# Patient Record
Sex: FEMALE | URBAN - METROPOLITAN AREA
[De-identification: names, ages, dates, MRNs, and addresses within clinical notes are randomized per-mention and may not be internally consistent; named-entity substitution may affect disease eponyms.]

---

## 2021-02-15 ENCOUNTER — TELEPHONE (OUTPATIENT)
Dept: DIABETES SERVICES | Age: 56
End: 2021-02-15

## 2021-03-03 ENCOUNTER — HOSPITAL ENCOUNTER (OUTPATIENT)
Dept: DIABETES SERVICES | Age: 56
Setting detail: THERAPIES SERIES
Discharge: HOME | End: 2021-03-03
Payer: COMMERCIAL

## 2021-03-03 DIAGNOSIS — E11.00 TYPE 2 DIABETES MELLITUS WITH HYPEROSMOLARITY WITHOUT COMA, WITHOUT LONG-TERM CURRENT USE OF INSULIN (HCC): Primary | ICD-10-CM

## 2021-03-03 PROCEDURE — G0108 DIAB MANAGE TRN  PER INDIV: HCPCS

## 2021-03-03 RX ORDER — LISINOPRIL 5 MG/1
TABLET ORAL
COMMUNITY

## 2021-03-03 RX ORDER — LIRAGLUTIDE 6 MG/ML
INJECTION SUBCUTANEOUS
COMMUNITY

## 2021-03-03 RX ORDER — NATEGLINIDE 120 MG/1
120 TABLET ORAL
COMMUNITY

## 2021-03-03 RX ORDER — FLUTICASONE PROPIONATE 50 MCG
SPRAY, SUSPENSION (ML) NASAL
COMMUNITY
Start: 2020-08-20

## 2021-03-03 RX ORDER — METFORMIN HYDROCHLORIDE 500 MG/1
TABLET, EXTENDED RELEASE ORAL 2 TIMES DAILY
COMMUNITY
Start: 2020-05-28

## 2021-03-03 RX ORDER — SODIUM CHLORIDE 0.65 %
DROPS NASAL
COMMUNITY
Start: 2020-08-20

## 2021-03-03 RX ORDER — MELOXICAM 15 MG/1
TABLET ORAL
COMMUNITY
Start: 2020-10-22

## 2021-03-03 RX ORDER — ALBUTEROL SULFATE 2.5 MG/3ML
SOLUTION RESPIRATORY (INHALATION)
COMMUNITY
Start: 2020-08-20

## 2021-03-03 RX ORDER — VITAMIN B COMPLEX
1 CAPSULE ORAL DAILY
COMMUNITY

## 2021-03-03 RX ORDER — TRIAMCINOLONE ACETONIDE 5 MG/G
CREAM TOPICAL
COMMUNITY
Start: 2020-10-22

## 2021-03-03 RX ORDER — CRISABOROLE 20 MG/G
OINTMENT TOPICAL
COMMUNITY
Start: 2020-09-21

## 2021-03-03 RX ORDER — GLIPIZIDE 10 MG/1
10 TABLET ORAL
COMMUNITY

## 2021-03-03 RX ORDER — MAGNESIUM OXIDE/MAG AA CHELATE 300 MG
CAPSULE ORAL
COMMUNITY

## 2021-03-03 RX ORDER — CHOLECALCIFEROL (VITAMIN D3) 100000/G
POWDER (GRAM) MISCELLANEOUS
COMMUNITY

## 2021-03-03 RX ORDER — OMEPRAZOLE 20 MG/1
TABLET, DELAYED RELEASE ORAL
COMMUNITY

## 2021-03-03 SDOH — ECONOMIC STABILITY: FOOD INSECURITY: ADDITIONAL INFORMATION: NO

## 2021-03-03 ASSESSMENT — PROBLEM AREAS IN DIABETES QUESTIONNAIRE (PAID)
FEELING THAT DIABETES IS TAKING UP TOO MUCH OF YOUR MENTAL AND PHYSICAL ENERGY EVERY DAY: 4
PAID-5 TOTAL SCORE: 12
FEELING SCARED WHEN YOU THINK ABOUT LIVING WITH DIABETES: 1
WORRYING ABOUT THE FUTURE AND THE POSSIBILITY OF SERIOUS COMPLICATIONS: 4

## 2021-03-03 NOTE — LETTER
[facility]  [facility] [address]  [place] [address] [address]  Phone: [phone]         March 3, 2021    To : Dr Callie Dos Santos  From: Cassi Dumont RN    Patient: Rosa White   YOB: 1965   Date of Visit: 3/3/21     An initial assessment to determine diabetes education needs was completed. Education plan includes :interpretation of lab results, blood sugar goals, complications of diabetes mellitus, hypoglycemia prevention and treatment, exercise, illness management, self-monitoring of blood glucose skills, nutrition, carbohydrate counting and role of diabetes medications. An ongoing plan was created to include: follow up one on one session with RD     Thank you for the opportunity to provide Diabetes Self Management Education to your patient.

## 2021-03-03 NOTE — PROGRESS NOTES
Diabetes Self- Management Education Program Assessment -   Also see Diabetic Screening  Patient, Philly Ayala,  here for diabetes self-management education  visit/ assessment. Today's visit was in an individual setting. MEDICAL HISTORY:  Past Medical History:   Diagnosis Date    Other disorders of kidney and ureter in diseases classified elsewhere     Pneumonia     mack lung - right lowe lobe     Family History   Problem Relation Age of Onset    Diabetes Paternal Grandmother     Diabetes Paternal Grandfather      Patient has no allergy information on record. There is no immunization history on file for this patient.   Current Medications  Current Outpatient Medications   Medication Sig Dispense Refill    metFORMIN (GLUCOPHAGE-XR) 500 MG extended release tablet 2 times daily       Liraglutide (VICTOZA) 18 MG/3ML SOPN SC injection as directed      SITagliptin (JANUVIA) 100 MG tablet 1 tablet      fluticasone-salmeterol (ADVAIR DISKUS) 100-50 MCG/DOSE diskus inhaler 1 puff      albuterol (PROVENTIL) (2.5 MG/3ML) 0.083% nebulizer solution 3 ml as needed      sodium chloride (AYR SALINE NASAL DROPS) 0.65 % (Soln) SOLN nasal drops 1 spray      Crisaborole (EUCRISA) 2 % OINT 1 application      fluticasone (FLONASE) 50 MCG/ACT nasal spray 1 spray in each nostril      lisinopril (PRINIVIL;ZESTRIL) 5 MG tablet 1 tablet      meloxicam (MOBIC) 15 MG tablet 1 tablet      guaiFENesin (MUCINEX) 600 MG extended release tablet 1 tablet      omeprazole (PRILOSEC OTC) 20 MG tablet 2 times daily (before meals)       triamcinolone (ARISTOCORT) 0.5 % cream 1 application      Cholecalciferol (VITAMIN D3) 497042 UNIT/GM POWD 1 tablet      glipiZIDE (GLUCOTROL) 10 MG tablet Take 10 mg by mouth 2 times daily (before meals) Indications: Dr Rip Richard      nateglinide (STARLIX) 120 MG tablet Take 120 mg by mouth 3 times daily (before meals) Indications: Dr Diego Feliz b complex vitamins capsule Take 1 capsule by mouth daily      Magnesium 300 MG CAPS 1 capsule with a meal       No current facility-administered medications for this encounter.    :     Comments:  Allergies:  Not on File      A1C blood level - at goal < 7%   No results found for: LABA1C  No results found for: GLUF, LABMICR, LDLCALC, CREATININE    Blood pressure ( 130/ 80)  Or less  BP Readings from Last 3 Encounters:   No data found for BP        Cholesterol ( LDL under  100)   No results found for: LDLCALC, LDLCHOLESTEROL, LDLDIRECT    Diabetes Self- Management Education Record    Participant Name: Estanislao Kawasaki  Referring Provider: No primary care provider on file. Assessment/Evaluation Ratings:  1=Needs Instruction   4=Demonstrates Understanding/Competency  2=Needs Review   NC=Not Covered    3=Comprehends Key Points  N/A=Not Applicable  Topics/Learning Objectives Pre-session Assess Date:  3/3/21rs Instr. Date Reinforce Date Post- session Eval Comments   Diabetes disease process & Treatment process: Define diabetes & pre-diabetes; Identify own type of diabetes; role of the pancreas; signs/symptoms; diagnostic criteria; prevention & treatment options; contributing factors. 1 3/3/21   3/3/21- ref from dr Karyn Russell   Patient stated had GDM 29 years ago and the pre diabetes for several years - now BG going up  And met with Dr Karyn Russell / darryl   Incorporating nutritional management into lifestyle: Describe effect of type, amount & timing of food on blood glucose; Describe basic meal planning techniques & current nutrition guideline   1  See notes in chart     What to eat - Food groups, When to eat - timing of meals and snacks, and How much to eat - portions control.     3/3/21rs Patient does read food labels and likes to cook her own meals - given target CHO of 45 - 60 grams and CHO ct sheet and booklet - + diabetesfoodhub.org website - patient very motivated to learn       calories/ day   CHO choices/ meal   CHO choices/  day   grams of protein /day   gram of fat /day Correctly read food labels & demonstrate CHO counting & portion control with personalized meal plan. Identify dining out strategies, & dietary sick day guidelines. 1       Incorporating physical activity into lifestyle:   Verbalize effect of exercise on blood glucose levels; benefits of regular exercise; safety considerations; contraindications; maintenance of activity. 1    - 3/3/21rs  less active than in past - now has lung disease and more SOB - she was working as  for school and had to leave due to this lung issue - now only tolerates short bouts of activity   Using medications safely:  Identify effects of diabetes medicines on blood glucose levels; List diabetes medication taken, action & side effects;    1    .3/3/21rs  On orals  - stated missed pre meal starlix - new med for patient and she is not used to taking meds with meals- also on metformin and glipizide and Januvia    Insulin / Injectable - Appropriate injection sites; proper storage; supplies needed; proper technique; safe needle disposal guidelines. 1    3/3/21rs  victoza - has cost issue with the medication - printed off [facility] website phone number to call to see if she would be able to get copay card or assistance    Monitoring blood glucose, interpreting and using results:  Identify recommended & personal blood glucose targets; importance of testing; testing supplies; HgbA1C target levels; Factors affecting blood glucose;  Importance of logging blood glucose levels for pattern recognition; ketone testing; safe lancet disposal.   1    3/3/21rs  Per Dr Marielena Myers fax A1c of 8.0% on 1/15/2021  Checking BG 2x per day - stated strip cost is 35 for 50 strips - discussed ways to find out if new meter would cost less as pt is also now under  insurance plan  - may need to change brand also discussed CGM     - 3/3/21rs  she did have CGM on for 2 weeks with Dr Marielena Myers eval - stated patten seen was elevated post meal BG      3/3/21rs  printed off info for freestyle tiffanie free trail program     Prevention, detection & treatment of acute complications:  Identify symptoms of hyper & hypoglycemia, and prevention1 & treatment strategies. 1       Describe sick day guidelines & indications for  physician notification. Identify short term consequences of poor control. Disaster preparedness strategies    1       Prevention, detection & treatment of chronic complications:  Define the natural course of diabetes & describe the relationship of blood glucose levels to long term complications of diabetes. Identify preventative measures & standards of care. 1    P3/3/21rs  er fax ref from dr Ralph Keller - , HDL 61, LDL 84, Tri 119 , urine micro albumin of 8.5   Developing strategies to address psychosocial issues:  Describe feelings about living with diabetes; Describe how stress, depression & anxiety affect blood glucose; Identify coping strategies; Identify support needed & support network available. 1    - 3/3/21rs  express frustration on her lung disease and now not working - as she worked all her adult life - worried about DM getting worse. - also worries about cost of medications and BG monitoring supplies - and follow up DMED appt  now she had to stop working - less family income   Developing strategies to promote health/change behavior: Identify 7 self-care behaviors; Personal health risk factors; Benefits, challenges & strategies for behavioral change;    1         Individualized goal selection. My goal , to help me improve my health, I will:   1. Healthy eating  - start to plan meals and try to keep in CHO targets      2.  Monitoring - follow up with insurance for cost coverage of freestyle tiffanie vs new meter / different band for lower cost of strips        Plan  Follow-up Appointments planned in individual setting - start with MNT session in 3 weeks    - given to patient CPT codes for DSME and MNT session and her diabetes diagnosis code to call insurance herself to see if diabetes education session are covered - 3/3/21rs       Next Appointment in 3 weeks. Instruction Method: [x]Lecture/Discussion  []Power Point Presentation  []Handouts  []Return Demonstration      Education Materials/Equipment Provided:    [x]Self-Management - Initial assessment - Enrolment in to ADA  Where do I Begin, Living with Type 2 diabetes\"  ADA home support program and handout for no concentrated sweets and diet meal planning basics, handout on diabetes education classes. -- 3/3/21rs       []Self-Management  Class 1 - \"How to Thrive: A guide for Your journey with Diabetes\" - ADA booklet 2020  - pages 4, 11- 15 , 18 -19     [] Self-Management  Class 2 - Meal Plan and handout for serving sizes, smarter snacking, Ready Set Carb Counting / Plate Method, Nutrient Conversion and International [address] Eating for People with Diabetes and Nutrition in the [employer] - fast facts about fast food -How to Thrive: A guide for Your journey with Diabetes\" - ADA booklet 2020  - pages 12 -17    [] Self-Management  Class 3 -  Diabetes ID card,  foot care tips sheet,  Individualized Diabetes report card - \"How to Thrive: A guide for Your journey with Diabetes\" - ADA booklet 2020 page 6- 9  and 20 - 35    [] Self-Management Class 4 - BD Booklet  Sick Day Rules and  [address] , recipe hand outs and tips, diabetes Cookbooks  ( when available) How to Thrive: A guide for Your journey with Diabetes\" - ADA booklet 2020  - pages 39 -39     []Self-Management -  Self-Management - 3 month follow -  AADE7 Self care behaviors work sheets,  Online resource list - March 2020  and   How to Thrive: A guide for Your journey with Diabetes\" - ADA booklet 2020  - pages 39. []Self-Management  Gestational - RN class -Gestational Diabetes Mellitus ( GDM) toolkit form [place] gestational diabetes postpartum care learning collaborative 2018.    Gestational diabetes handout from number - for information and referral to [address]  Clinically  [address], FOOT, CARDIAC, WOUND, WEIGHT MANAGEMENT        []Other  Clarissa Hong, RN

## 2021-03-23 ENCOUNTER — HOSPITAL ENCOUNTER (OUTPATIENT)
Dept: DIABETES SERVICES | Age: 56
Setting detail: THERAPIES SERIES
Discharge: HOME | End: 2021-03-23
Payer: COMMERCIAL

## 2021-03-23 DIAGNOSIS — E11.00 TYPE 2 DIABETES MELLITUS WITH HYPEROSMOLARITY WITHOUT COMA, WITHOUT LONG-TERM CURRENT USE OF INSULIN (HCC): Primary | ICD-10-CM

## 2021-03-23 PROCEDURE — 97802 MEDICAL NUTRITION INDIV IN: CPT

## 2021-03-23 NOTE — PROGRESS NOTES
Diabetes Self- Management Education Program Assessment -   Also see Diabetic Screening  Patient, Marleny Sahni,  here for diabetes self-management education  visit/ assessment. Today's visit was in an individual setting. MEDICAL HISTORY:  Past Medical History:   Diagnosis Date    Other disorders of kidney and ureter in diseases classified elsewhere     Pneumonia     mack lung - right lowe lobe     Family History   Problem Relation Age of Onset    Diabetes Paternal Grandmother     Diabetes Paternal Grandfather      Patient has no allergy information on record. There is no immunization history on file for this patient.   Current Medications  Current Outpatient Medications   Medication Sig Dispense Refill    metFORMIN (GLUCOPHAGE-XR) 500 MG extended release tablet 2 times daily       Liraglutide (VICTOZA) 18 MG/3ML SOPN SC injection as directed      SITagliptin (JANUVIA) 100 MG tablet 1 tablet      fluticasone-salmeterol (ADVAIR DISKUS) 100-50 MCG/DOSE diskus inhaler 1 puff      albuterol (PROVENTIL) (2.5 MG/3ML) 0.083% nebulizer solution 3 ml as needed      sodium chloride (AYR SALINE NASAL DROPS) 0.65 % (Soln) SOLN nasal drops 1 spray      Crisaborole (EUCRISA) 2 % OINT 1 application      fluticasone (FLONASE) 50 MCG/ACT nasal spray 1 spray in each nostril      lisinopril (PRINIVIL;ZESTRIL) 5 MG tablet 1 tablet      Magnesium 300 MG CAPS 1 capsule with a meal      meloxicam (MOBIC) 15 MG tablet 1 tablet      guaiFENesin (MUCINEX) 600 MG extended release tablet 1 tablet      omeprazole (PRILOSEC OTC) 20 MG tablet 2 times daily (before meals)       triamcinolone (ARISTOCORT) 0.5 % cream 1 application      Cholecalciferol (VITAMIN D3) 327915 UNIT/GM POWD 1 tablet      glipiZIDE (GLUCOTROL) 10 MG tablet Take 10 mg by mouth 2 times daily (before meals) Indications: Dr Darin Mackay      nateglinide (STARLIX) 120 MG tablet Take 120 mg by mouth 3 times daily (before meals) Indications: Dr Mendel Patel b complex vitamins capsule Take 1 capsule by mouth daily       No current facility-administered medications for this encounter.    :     Comments:  Allergies:  Not on File      A1C blood level - at goal < 7%   No results found for: LABA1C  No results found for: GLUF, LABMICR, LDLCALC, CREATININE    Blood pressure ( 130/ 80)  Or less  BP Readings from Last 3 Encounters:   No data found for BP        Cholesterol ( LDL under  100)   No results found for: LDLCALC, LDLCHOLESTEROL, LDLDIRECT    Diabetes Self- Management Education Record    Participant Name: Monik Spence  Referring Provider: No primary care provider on file. Assessment/Evaluation Ratings:  1=Needs Instruction   4=Demonstrates Understanding/Competency  2=Needs Review   NC=Not Covered    3=Comprehends Key Points  N/A=Not Applicable  Topics/Learning Objectives Pre-session Assess Date:  3/3/21rs Instr. Date Reinforce Date Post- session Eval Comments   Diabetes disease process & Treatment process: Define diabetes & pre-diabetes; Identify own type of diabetes; role of the pancreas; signs/symptoms; diagnostic criteria; prevention & treatment options; contributing factors. 1 3/3/21   3/3/21- ref from dr Baylee Yi   Patient stated had GDM 29 years ago and the pre diabetes for several years - now BG going up  And met with Dr Baylee Yi / darryl   Incorporating nutritional management into lifestyle: Describe effect of type, amount & timing of food on blood glucose; Describe basic meal planning techniques & current nutrition guideline   1  See notes in chart  3/23/21 JW reviewed basics of cho counting and meal planning. Pt has good knowledge base of info just struggles at times with implementing. What to eat - Food groups, When to eat - timing of meals and snacks, and How much to eat - portions control.     3/3/21rs Patient does read food labels and likes to cook her own meals - given target CHO of 45 - 60 grams and CHO ct sheet and booklet - + diabetesfoodhub.org website - patient very motivated to learn    Suggested [id number] calories/ day   CHO choices/ meal 3-4,1 3-4,1,3-4,1   CHO choices/  day   grams of protein /day   gram of fat /day     Correctly read food labels & demonstrate CHO counting & portion control with personalized meal plan. Identify dining out strategies, & dietary sick day guidelines. 1 3/23/21 JW      Incorporating physical activity into lifestyle:   Verbalize effect of exercise on blood glucose levels; benefits of regular exercise; safety considerations; contraindications; maintenance of activity. 1    - 3/3/21rs  less active than in past - now has lung disease and more SOB - she was working as  for school and had to leave due to this lung issue - now only tolerates short bouts of activity   Using medications safely:  Identify effects of diabetes medicines on blood glucose levels; List diabetes medication taken, action & side effects;    1    .3/3/21rs  On orals  - stated missed pre meal starlix - new med for patient and she is not used to taking meds with meals- also on metformin and glipizide and Januvia    Insulin / Injectable - Appropriate injection sites; proper storage; supplies needed; proper technique; safe needle disposal guidelines. 1    3/3/21rs  victoza - has cost issue with the medication - printed off [facility] website phone number to call to see if she would be able to get copay card or assistance    Monitoring blood glucose, interpreting and using results:  Identify recommended & personal blood glucose targets; importance of testing; testing supplies; HgbA1C target levels; Factors affecting blood glucose;  Importance of logging blood glucose levels for pattern recognition; ketone testing; safe lancet disposal.   1 3/23/21 JW   3/3/21rs  Per Dr Nabil Sadler fax A1c of 8.0% on 1/15/2021  Checking BG 2x per day - stated strip cost is 35 for 50 strips - discussed ways to find out if new meter would cost less as pt is also now under  meter / different band for lower cost of strips        Plan  Follow-up Appointments planned in individual setting - start with MNT session in 3 weeks    - given to patient CPT codes for DSME and MNT session and her diabetes diagnosis code to call insurance herself to see if diabetes education session are covered - 3/3/21rs       Next Appointment in 3 weeks. Instruction Method: [x]Lecture/Discussion  []Power Point Presentation  []Handouts  []Return Demonstration      Education Materials/Equipment Provided:    [x]Self-Management - Initial assessment - Enrolment in to ADA  Where do I Begin, Living with Type 2 diabetes\"  ADA home support program and handout for no concentrated sweets and diet meal planning basics, handout on diabetes education classes.  -- 3/3/21rs       []Self-Management  Class 1 - \"How to Thrive: A guide for Your journey with Diabetes\" - ADA booklet 2020  - pages 4, 11- 15 , 18 -19     [x] Self-Management  Class 2 - Meal Plan and handout for serving sizes, smarter snacking, Ready Set Carb Counting / Plate Method, Nutrient Conversion and International [address] Eating for People with Diabetes and Nutrition in the [place] [address] - fast facts about fast food -How to Thrive: A guide for Your journey with Diabetes\" - ADA booklet 2020  - pages 12 -173/23/21 JW  [] Self-Management  Class 3 -  Diabetes ID card,  foot care tips sheet,  Individualized Diabetes report card - \"How to Thrive: A guide for Your journey with Diabetes\" - ADA booklet 2020 page 6- 9  and 20 - 35    [] Self-Management Class 4 - BD Booklet  Sick Day Rules and  [address] , recipe hand outs and tips, diabetes Cookbooks  ( when available) How to Thrive: A guide for Your journey with Diabetes\" - ADA booklet 2020  - pages 39 -39     []Self-Management -  Self-Management - 3 month follow -  AADE7 Self care behaviors work sheets,  Online resource list - March 2020  and   How to Thrive: A guide for Your journey [email or web address] org     [x]  Internet web sites - ADAWeb site: diabetes. org and diabetesfoodhub.org-- 3/3/21rs     Post Education Referrals:      [] [place] Tobacco Quit information sheet and [address] [facility] , [address]       [] Dental care - [facility]     [] [facility] ([facility]) link  phone number - for information and referral to [address] [facility], WOUND, WEIGHT MANAGEMENT        []Other  Cheryl Adkins RD, LD

## 2024-05-29 ENCOUNTER — HOSPITAL ENCOUNTER (EMERGENCY)
Age: 59
Discharge: HOME | End: 2024-05-29
Attending: EMERGENCY MEDICINE
Payer: COMMERCIAL

## 2024-05-29 ENCOUNTER — APPOINTMENT (OUTPATIENT)
Dept: CT IMAGING | Age: 59
End: 2024-05-29
Payer: COMMERCIAL

## 2024-05-29 VITALS
DIASTOLIC BLOOD PRESSURE: 86 MMHG | HEIGHT: 69 IN | HEART RATE: 108 BPM | BODY MASS INDEX: 34.07 KG/M2 | RESPIRATION RATE: 18 BRPM | TEMPERATURE: 98.1 F | SYSTOLIC BLOOD PRESSURE: 140 MMHG | WEIGHT: 230 LBS | OXYGEN SATURATION: 97 %

## 2024-05-29 DIAGNOSIS — J01.10 ACUTE NON-RECURRENT FRONTAL SINUSITIS: ICD-10-CM

## 2024-05-29 DIAGNOSIS — R07.9 CHEST PAIN, UNSPECIFIED TYPE: Primary | ICD-10-CM

## 2024-05-29 DIAGNOSIS — R00.0 TACHYCARDIA: ICD-10-CM

## 2024-05-29 LAB
ANION GAP SERPL CALCULATED.3IONS-SCNC: 10 MMOL/L (ref 9–17)
BACTERIA URNS QL MICRO: ABNORMAL
BASOPHILS # BLD: 0 K/UL (ref 0–0.2)
BASOPHILS NFR BLD: 0 % (ref 0–2)
BILIRUB UR QL STRIP: NEGATIVE
BUN SERPL-MCNC: 15 MG/DL (ref 6–20)
CALCIUM SERPL-MCNC: 9.9 MG/DL (ref 8.6–10.4)
CHARACTER UR: ABNORMAL
CHLORIDE SERPL-SCNC: 101 MMOL/L (ref 98–107)
CLARITY UR: CLEAR
CO2 SERPL-SCNC: 25 MMOL/L (ref 20–31)
COLOR UR: YELLOW
CREAT SERPL-MCNC: 0.8 MG/DL (ref 0.5–0.9)
EKG ATRIAL RATE: 109 BPM
EKG P AXIS: 55 DEGREES
EKG P-R INTERVAL: 162 MS
EKG Q-T INTERVAL: 334 MS
EKG QRS DURATION: 70 MS
EKG QTC CALCULATION (BAZETT): 449 MS
EKG R AXIS: -40 DEGREES
EKG T AXIS: 40 DEGREES
EKG VENTRICULAR RATE: 109 BPM
EOSINOPHIL # BLD: 0.1 K/UL (ref 0–0.4)
EOSINOPHILS RELATIVE PERCENT: 1 % (ref 1–4)
EPI CELLS #/AREA URNS HPF: ABNORMAL /HPF (ref 0–5)
ERYTHROCYTE [DISTWIDTH] IN BLOOD BY AUTOMATED COUNT: 12.9 % (ref 12.5–15.4)
GFR, ESTIMATED: 86 ML/MIN/1.73M2
GLUCOSE BLD-MCNC: 216 MG/DL (ref 65–105)
GLUCOSE SERPL-MCNC: 326 MG/DL (ref 70–99)
GLUCOSE UR STRIP-MCNC: ABNORMAL MG/DL
HCT VFR BLD AUTO: 42.8 % (ref 36–46)
HGB BLD-MCNC: 14.3 G/DL (ref 12–16)
HGB UR QL STRIP.AUTO: NEGATIVE
KETONES UR STRIP-MCNC: ABNORMAL MG/DL
LEUKOCYTE ESTERASE UR QL STRIP: ABNORMAL
LYMPHOCYTES NFR BLD: 1.5 K/UL (ref 1–4.8)
LYMPHOCYTES RELATIVE PERCENT: 13 % (ref 24–44)
MAGNESIUM SERPL-MCNC: 1.6 MG/DL (ref 1.6–2.6)
MCH RBC QN AUTO: 29.5 PG (ref 26–34)
MCHC RBC AUTO-ENTMCNC: 33.4 G/DL (ref 31–37)
MCV RBC AUTO: 88.4 FL (ref 80–100)
MONOCYTES NFR BLD: 0.6 K/UL (ref 0.1–1.2)
MONOCYTES NFR BLD: 5 % (ref 2–11)
NEUTROPHILS NFR BLD: 81 % (ref 36–66)
NEUTS SEG NFR BLD: 9.3 K/UL (ref 1.8–7.7)
NITRITE UR QL STRIP: NEGATIVE
PH UR STRIP: 6 [PH] (ref 5–8)
PLATELET # BLD AUTO: 297 K/UL (ref 140–450)
PMV BLD AUTO: 8.5 FL (ref 6–12)
POTASSIUM SERPL-SCNC: 4 MMOL/L (ref 3.7–5.3)
PROT UR STRIP-MCNC: NEGATIVE MG/DL
RBC # BLD AUTO: 4.83 M/UL (ref 4–5.2)
RBC #/AREA URNS HPF: ABNORMAL /HPF (ref 0–2)
SARS-COV-2 RDRP RESP QL NAA+PROBE: NOT DETECTED
SODIUM SERPL-SCNC: 136 MMOL/L (ref 135–144)
SP GR UR STRIP: 1.02 (ref 1–1.03)
SPECIMEN DESCRIPTION: NORMAL
T4 FREE SERPL-MCNC: 1.5 NG/DL (ref 0.92–1.68)
TROPONIN I SERPL HS-MCNC: 9 NG/L (ref 0–14)
TROPONIN I SERPL HS-MCNC: 9 NG/L (ref 0–14)
TSH SERPL DL<=0.05 MIU/L-ACNC: 1.53 UIU/ML (ref 0.3–5)
UROBILINOGEN UR STRIP-ACNC: NORMAL EU/DL (ref 0–1)
WBC #/AREA URNS HPF: ABNORMAL /HPF (ref 0–5)
WBC OTHER # BLD: 11.5 K/UL (ref 3.5–11)

## 2024-05-29 PROCEDURE — 85025 COMPLETE CBC W/AUTO DIFF WBC: CPT

## 2024-05-29 PROCEDURE — 2580000003 HC RX 258: Performed by: NURSE PRACTITIONER

## 2024-05-29 PROCEDURE — 36415 COLL VENOUS BLD VENIPUNCTURE: CPT

## 2024-05-29 PROCEDURE — 6360000004 HC RX CONTRAST MEDICATION: Performed by: NURSE PRACTITIONER

## 2024-05-29 PROCEDURE — 84443 ASSAY THYROID STIM HORMONE: CPT

## 2024-05-29 PROCEDURE — 82947 ASSAY GLUCOSE BLOOD QUANT: CPT

## 2024-05-29 PROCEDURE — 81001 URINALYSIS AUTO W/SCOPE: CPT

## 2024-05-29 PROCEDURE — 83735 ASSAY OF MAGNESIUM: CPT

## 2024-05-29 PROCEDURE — 96361 HYDRATE IV INFUSION ADD-ON: CPT

## 2024-05-29 PROCEDURE — 87635 SARS-COV-2 COVID-19 AMP PRB: CPT

## 2024-05-29 PROCEDURE — 93005 ELECTROCARDIOGRAM TRACING: CPT | Performed by: NURSE PRACTITIONER

## 2024-05-29 PROCEDURE — 96360 HYDRATION IV INFUSION INIT: CPT

## 2024-05-29 PROCEDURE — 80048 BASIC METABOLIC PNL TOTAL CA: CPT

## 2024-05-29 PROCEDURE — 99285 EMERGENCY DEPT VISIT HI MDM: CPT

## 2024-05-29 PROCEDURE — 84484 ASSAY OF TROPONIN QUANT: CPT

## 2024-05-29 PROCEDURE — 71260 CT THORAX DX C+: CPT

## 2024-05-29 PROCEDURE — 84439 ASSAY OF FREE THYROXINE: CPT

## 2024-05-29 RX ORDER — 0.9 % SODIUM CHLORIDE 0.9 %
80 INTRAVENOUS SOLUTION INTRAVENOUS ONCE
Status: DISCONTINUED | OUTPATIENT
Start: 2024-05-29 | End: 2024-05-29 | Stop reason: HOSPADM

## 2024-05-29 RX ORDER — FLUCONAZOLE 150 MG/1
150 TABLET ORAL ONCE
Qty: 1 TABLET | Refills: 0 | Status: SHIPPED | OUTPATIENT
Start: 2024-05-29 | End: 2024-05-29

## 2024-05-29 RX ORDER — AMOXICILLIN AND CLAVULANATE POTASSIUM 875; 125 MG/1; MG/1
1 TABLET, FILM COATED ORAL 2 TIMES DAILY
Qty: 14 TABLET | Refills: 0 | Status: SHIPPED | OUTPATIENT
Start: 2024-05-29 | End: 2024-06-05

## 2024-05-29 RX ORDER — SODIUM CHLORIDE 0.9 % (FLUSH) 0.9 %
10 SYRINGE (ML) INJECTION PRN
Status: DISCONTINUED | OUTPATIENT
Start: 2024-05-29 | End: 2024-05-29 | Stop reason: HOSPADM

## 2024-05-29 RX ORDER — 0.9 % SODIUM CHLORIDE 0.9 %
1000 INTRAVENOUS SOLUTION INTRAVENOUS ONCE
Status: COMPLETED | OUTPATIENT
Start: 2024-05-29 | End: 2024-05-29

## 2024-05-29 RX ADMIN — SODIUM CHLORIDE, PRESERVATIVE FREE 10 ML: 5 INJECTION INTRAVENOUS at 11:07

## 2024-05-29 RX ADMIN — IOPAMIDOL 75 ML: 755 INJECTION, SOLUTION INTRAVENOUS at 11:07

## 2024-05-29 RX ADMIN — Medication 80 ML: at 11:07

## 2024-05-29 RX ADMIN — SODIUM CHLORIDE 1000 ML: 9 INJECTION, SOLUTION INTRAVENOUS at 10:27

## 2024-05-29 ASSESSMENT — PAIN - FUNCTIONAL ASSESSMENT: PAIN_FUNCTIONAL_ASSESSMENT: NONE - DENIES PAIN

## 2024-05-29 ASSESSMENT — LIFESTYLE VARIABLES
HOW MANY STANDARD DRINKS CONTAINING ALCOHOL DO YOU HAVE ON A TYPICAL DAY: 1 OR 2
HOW OFTEN DO YOU HAVE A DRINK CONTAINING ALCOHOL: 2-4 TIMES A MONTH

## 2024-05-29 NOTE — ED PROVIDER NOTES
[facility] EMERGENCY DEPARTMENT  EMERGENCY DEPARTMENT ENCOUNTER      Pt Name: Soledad Marie  MRN: [id number]  Birthdate 1965  Date of evaluation: 5/29/2024  Provider: KAYCE Sotelo CNP  2:27 PM    CHIEF COMPLAINT       Chief Complaint   Patient presents with    Chest Pain     Chest pain x 2 months sent from pcp, dizziness and rapid hr seen today          HISTORY OF PRESENT ILLNESS    Soledad Marie is a 58 y.o. female who presents to the emergency department for evaluation of chest pain dizziness and rapid heart rate.  Patient reports intermittently for the past 2 months she has been getting episodes of chest pain feels a tight band radiates into the left anterior chest and breast and around to the back.  Seems that she can calm it down with taking some deep breaths.  She does not feel anxious.  There is been no injury.  No history of DVT PE MI or CAD.  She states that the episodes are increasing in frequency.  They do not seem to be associated with any activity.  Does feel short of breath.  She states the biggest complaint is that of fatigue.  She went to her doctor's office this morning for routine checkup, over the past 2 days she has been getting sinus symptoms, nasal congestion, feels that there is fluid behind her ears.  She states she has been noticing some dizziness especially when changing positions and she brought this up to her family doctor.  They did an EKG in the office and noticed significant changes from previous EKG done in 2018.  She is tachycardic.  No fevers no chills no abdominal pain nausea vomiting diarrhea no recent illness    HPI    Nursing Notes were reviewed.    REVIEW OF SYSTEMS       Review of Systems   All other systems reviewed and are negative.      Except as noted above the remainder of the review of systems was reviewed and negative.       PAST MEDICAL HISTORY     Past Medical History:   Diagnosis Date    Other disorders of kidney and ureter in

## 2024-05-29 NOTE — DISCHARGE INSTRUCTIONS
Home.  Antibiotics as prescribed.  Use Mucinex D to help with decongestion.  This is sold behind the counter and you will need to pick it up from the pharmacist when you get your antibiotics.  Diflucan after you have finished your antibiotic prescription.  Return to the emergency department for chest pain, shortness of breath, nausea, vomiting, worsening symptoms, lightheadedness chest pain nausea vomiting, any symptoms that recur or any other concerns.  Please discuss the elevated glucose tests with your primary care physician to decide if you need any further diabetic management

## 2024-05-29 NOTE — ED TRIAGE NOTES
Chest pain midsternal radiating to left side into armpit x 2 months saw pcp today and was told to come to ER for further evaluation, new symptoms dizziness, pt has hx of asthma and is always slightly sob, denies worsening sob

## 2024-05-29 NOTE — ED PROVIDER NOTES
Emergency Department     Faculty Attestation    I performed a history and physical examination of the patient and discussed management with the mid level provider. I reviewed the mid level provider's note and agree with the documented findings and plan of care. Any areas of disagreement are noted on the chart. I was personally present for the key portions of any procedures. I have documented in the chart those procedures where I was not present during the key portions. I have reviewed the emergency nurses triage note. I agree with the chief complaint, past medical history, past surgical history, allergies, medications, social and family history as documented unless otherwise noted below. Documentation of the HPI, Physical Exam and Medical Decision Making performed by medical students or scribes is based on my personal performance of the HPI, PE and MDM. For Physician Assistant/ Nurse Practitioner cases/documentation I have personally evaluated this patient and have completed at least one if not all key elements of the E/M (history, physical exam, and MDM). Additional findings are as noted.      Primary Care Physician:  No primary care provider on file.    CHIEF COMPLAINT       Chief Complaint   Patient presents with    Chest Pain     Chest pain x 2 months sent from pcp, dizziness and rapid hr seen today        RECENT VITALS:   Temp: 98.1 °F (36.7 °C),  Pulse: (!) 108, Respirations: 18, BP: (!) 140/86    LABS:  Labs Reviewed   CBC WITH AUTO DIFFERENTIAL - Abnormal; Notable for the following components:       Result Value    WBC 11.5 (*)     Neutrophils % 81 (*)     Lymphocytes % 13 (*)     Neutrophils Absolute 9.30 (*)     All other components within normal limits   BASIC METABOLIC PANEL - Abnormal; Notable for the following components:    Glucose 326 (*)     All other components within normal limits   URINALYSIS WITH REFLEX TO CULTURE - Abnormal; Notable for the following components:    Glucose, Ur 3+ (*)

## 2025-01-05 ENCOUNTER — APPOINTMENT (OUTPATIENT)
Dept: CT IMAGING | Age: 60
End: 2025-01-05
Payer: COMMERCIAL

## 2025-01-05 ENCOUNTER — HOSPITAL ENCOUNTER (EMERGENCY)
Age: 60
Discharge: HOME | End: 2025-01-05
Attending: EMERGENCY MEDICINE
Payer: COMMERCIAL

## 2025-01-05 VITALS
OXYGEN SATURATION: 97 % | SYSTOLIC BLOOD PRESSURE: 140 MMHG | DIASTOLIC BLOOD PRESSURE: 67 MMHG | RESPIRATION RATE: 17 BRPM | HEART RATE: 75 BPM | TEMPERATURE: 97.3 F

## 2025-01-05 DIAGNOSIS — M54.31 SCIATICA OF RIGHT SIDE: Primary | ICD-10-CM

## 2025-01-05 LAB
ALBUMIN SERPL-MCNC: 3.9 G/DL (ref 3.5–5.2)
ALBUMIN/GLOB SERPL: 1.5 {RATIO} (ref 1–2.5)
ALP SERPL-CCNC: 101 U/L (ref 35–104)
ALT SERPL-CCNC: 18 U/L (ref 10–35)
ANION GAP SERPL CALCULATED.3IONS-SCNC: 12 MMOL/L (ref 9–16)
AST SERPL-CCNC: 18 U/L (ref 10–35)
BASOPHILS # BLD: 0.09 K/UL (ref 0–0.2)
BASOPHILS NFR BLD: 1 % (ref 0–2)
BILIRUB DIRECT SERPL-MCNC: 0.2 MG/DL (ref 0–0.2)
BILIRUB INDIRECT SERPL-MCNC: 0.3 MG/DL
BILIRUB SERPL-MCNC: 0.5 MG/DL (ref 0–1.2)
BILIRUB UR QL STRIP: NEGATIVE
BUN SERPL-MCNC: 14 MG/DL (ref 6–20)
CALCIUM SERPL-MCNC: 9.5 MG/DL (ref 8.6–10.4)
CHLORIDE SERPL-SCNC: 100 MMOL/L (ref 98–107)
CLARITY UR: ABNORMAL
CO2 SERPL-SCNC: 24 MMOL/L (ref 20–31)
COLOR UR: YELLOW
CREAT SERPL-MCNC: 0.6 MG/DL (ref 0.5–0.9)
EOSINOPHIL # BLD: 0.17 K/UL (ref 0–0.44)
EOSINOPHILS RELATIVE PERCENT: 2 % (ref 1–4)
EPI CELLS #/AREA URNS HPF: ABNORMAL /HPF (ref 0–5)
ERYTHROCYTE [DISTWIDTH] IN BLOOD BY AUTOMATED COUNT: 12.1 % (ref 11.8–14.4)
GFR, ESTIMATED: >90 ML/MIN/1.73M2
GLUCOSE SERPL-MCNC: 245 MG/DL (ref 74–99)
GLUCOSE UR STRIP-MCNC: ABNORMAL MG/DL
HCT VFR BLD AUTO: 42 % (ref 36.3–47.1)
HGB BLD-MCNC: 14.5 G/DL (ref 11.9–15.1)
HGB UR QL STRIP.AUTO: NEGATIVE
IMM GRANULOCYTES # BLD AUTO: 0.05 K/UL (ref 0–0.3)
IMM GRANULOCYTES NFR BLD: 1 %
KETONES UR STRIP-MCNC: ABNORMAL MG/DL
LEUKOCYTE ESTERASE UR QL STRIP: NEGATIVE
LIPASE SERPL-CCNC: 25 U/L (ref 13–60)
LYMPHOCYTES NFR BLD: 1.79 K/UL (ref 1.1–3.7)
LYMPHOCYTES RELATIVE PERCENT: 19 % (ref 24–43)
MCH RBC QN AUTO: 30.7 PG (ref 25.2–33.5)
MCHC RBC AUTO-ENTMCNC: 34.5 G/DL (ref 28.4–34.8)
MCV RBC AUTO: 88.8 FL (ref 82.6–102.9)
MONOCYTES NFR BLD: 0.58 K/UL (ref 0.1–1.2)
MONOCYTES NFR BLD: 6 % (ref 3–12)
MUCOUS THREADS URNS QL MICRO: ABNORMAL
NEUTROPHILS NFR BLD: 71 % (ref 36–65)
NEUTS SEG NFR BLD: 6.71 K/UL (ref 1.5–8.1)
NITRITE UR QL STRIP: NEGATIVE
NRBC BLD-RTO: 0 PER 100 WBC
PH UR STRIP: 6.5 [PH] (ref 5–8)
PLATELET # BLD AUTO: 300 K/UL (ref 138–453)
PMV BLD AUTO: 10.2 FL (ref 8.1–13.5)
POTASSIUM SERPL-SCNC: 4 MMOL/L (ref 3.7–5.3)
PROT SERPL-MCNC: 6.4 G/DL (ref 6.6–8.7)
PROT UR STRIP-MCNC: NEGATIVE MG/DL
RBC # BLD AUTO: 4.73 M/UL (ref 3.95–5.11)
RBC #/AREA URNS HPF: ABNORMAL /HPF (ref 0–2)
SODIUM SERPL-SCNC: 136 MMOL/L (ref 136–145)
SP GR UR STRIP: 1.01 (ref 1–1.03)
UROBILINOGEN UR STRIP-ACNC: NORMAL EU/DL (ref 0–1)
WBC #/AREA URNS HPF: ABNORMAL /HPF (ref 0–5)
WBC OTHER # BLD: 9.4 K/UL (ref 3.5–11.3)

## 2025-01-05 PROCEDURE — 6360000002 HC RX W HCPCS: Performed by: PHYSICIAN ASSISTANT

## 2025-01-05 PROCEDURE — 96375 TX/PRO/DX INJ NEW DRUG ADDON: CPT

## 2025-01-05 PROCEDURE — 80076 HEPATIC FUNCTION PANEL: CPT

## 2025-01-05 PROCEDURE — 96374 THER/PROPH/DIAG INJ IV PUSH: CPT

## 2025-01-05 PROCEDURE — 99284 EMERGENCY DEPT VISIT MOD MDM: CPT

## 2025-01-05 PROCEDURE — 83690 ASSAY OF LIPASE: CPT

## 2025-01-05 PROCEDURE — 85025 COMPLETE CBC W/AUTO DIFF WBC: CPT

## 2025-01-05 PROCEDURE — 74176 CT ABD & PELVIS W/O CONTRAST: CPT

## 2025-01-05 PROCEDURE — 80048 BASIC METABOLIC PNL TOTAL CA: CPT

## 2025-01-05 PROCEDURE — 81001 URINALYSIS AUTO W/SCOPE: CPT

## 2025-01-05 RX ORDER — MORPHINE SULFATE 4 MG/ML
4 INJECTION, SOLUTION INTRAMUSCULAR; INTRAVENOUS ONCE
Status: COMPLETED | OUTPATIENT
Start: 2025-01-05 | End: 2025-01-05

## 2025-01-05 RX ORDER — METHOCARBAMOL 750 MG/1
750 TABLET, FILM COATED ORAL 4 TIMES DAILY
Qty: 40 TABLET | Refills: 0 | Status: SHIPPED | OUTPATIENT
Start: 2025-01-05 | End: 2025-01-15

## 2025-01-05 RX ORDER — LIDOCAINE 50 MG/G
1 PATCH TOPICAL DAILY
Qty: 30 PATCH | Refills: 0 | Status: SHIPPED | OUTPATIENT
Start: 2025-01-05

## 2025-01-05 RX ORDER — ONDANSETRON 2 MG/ML
4 INJECTION INTRAMUSCULAR; INTRAVENOUS ONCE
Status: COMPLETED | OUTPATIENT
Start: 2025-01-05 | End: 2025-01-05

## 2025-01-05 RX ORDER — NAPROXEN 500 MG/1
500 TABLET ORAL 2 TIMES DAILY WITH MEALS
Qty: 20 TABLET | Refills: 0 | Status: SHIPPED | OUTPATIENT
Start: 2025-01-05

## 2025-01-05 RX ORDER — HYDROCODONE BITARTRATE AND ACETAMINOPHEN 5; 325 MG/1; MG/1
1-2 TABLET ORAL EVERY 8 HOURS PRN
Qty: 15 TABLET | Refills: 0 | Status: SHIPPED | OUTPATIENT
Start: 2025-01-05 | End: 2025-01-10

## 2025-01-05 RX ADMIN — MORPHINE SULFATE 4 MG: 4 INJECTION, SOLUTION INTRAMUSCULAR; INTRAVENOUS at 13:30

## 2025-01-05 RX ADMIN — ONDANSETRON 4 MG: 2 INJECTION, SOLUTION INTRAMUSCULAR; INTRAVENOUS at 13:30

## 2025-01-05 ASSESSMENT — PAIN DESCRIPTION - LOCATION
LOCATION: ABDOMEN
LOCATION: ABDOMEN;FLANK

## 2025-01-05 ASSESSMENT — PAIN DESCRIPTION - DESCRIPTORS: DESCRIPTORS: ACHING

## 2025-01-05 ASSESSMENT — PAIN SCALES - GENERAL
PAINLEVEL_OUTOF10: 9
PAINLEVEL_OUTOF10: 8
PAINLEVEL_OUTOF10: 7

## 2025-01-05 ASSESSMENT — PAIN DESCRIPTION - ORIENTATION: ORIENTATION: RIGHT

## 2025-01-05 ASSESSMENT — PAIN - FUNCTIONAL ASSESSMENT: PAIN_FUNCTIONAL_ASSESSMENT: 0-10

## 2025-01-05 NOTE — DISCHARGE INSTRUCTIONS
Take meds as prescribed.  Follow outpatient tomorrow with doctor or by calling [phone] or [phone].   Return to ER immediately if symptoms worsen or persist.    Do not drive, operate machinery, climb or engage in any dangerous activity while taking narcotics or norco

## 2025-01-05 NOTE — ED NOTES
Pt medicated per order, tolerated well.  Pt placed on BP and pulse ox monitoring with alarms set   ---

## 2025-01-05 NOTE — ED NOTES
Pt provided discharge paperwork and educated on prescriptions.  Pt instructed to follow up with PCP and told to return to ED with any new / worsened symptoms.  Pt verbalizes understanding and denies additional questions or concerns at this time.  Pt ambulatory out of ED with steady gait accompanied by spouse

## 2025-01-05 NOTE — ED NOTES
Pt resting on stretcher with  at bedside.  Awaiting CT results, pt aware.  Call light remains within reach

## 2025-01-05 NOTE — ED NOTES
Pt to ED c/o right sided abdominal pain.  Pt states that she had this similar pain about one week ago but states that pain resolved on it's own.  Pt states that last night, after eating a greasy cheeseburger, states that pain returned to the right side.  Pt unsure on origin but reports pain radiating to the RUQ, RLQ and into the right groin.  Pt reports associated nausea today but denies vomiting or diarrhea.  Pt reports hx of hysterectomy and appendectomy.

## 2025-01-05 NOTE — ED NOTES
Pt ambulatory to restroom and back to stretcher with steady gait.  Pt placed on BP and pulse ox monitoring with alarms set

## 2025-01-06 NOTE — ED PROVIDER NOTES
[facility] EMERGENCY DEPARTMENT  eMERGENCY dEPARTMENTeNCOUnter      Pt Name: Soledad Marie  MRN: [id number]  Birthdate 1965  Date ofevaluation: 1/5/2025  Provider: Marvin Wylie PA-C    CHIEF COMPLAINT       Chief Complaint   Patient presents with    Abdominal Pain    Flank Pain     C/o right flank pain that radiates from RLQ. C/o nausea, no vomiting. Denies hx of kidney stones. C/o urinary frequency         HISTORY OF PRESENT ILLNESS  (Location/Symptom, Timing/Onset, Context/Setting, Quality, Duration, Modifying Factors, Severity.)   Soledad Marie is a 59 y.o. female who presents to the emergency department with right flank pain radiates to the right groin area and down the right leg.  Pain worse to move relieved with rest.  Intermittent over the last couple weeks but started to flareup again over the last couple days.      Nursing Notes were reviewed.    ALLERGIES     Patient has no known allergies.    CURRENT MEDICATIONS       Discharge Medication List as of 1/5/2025  4:18 PM        CONTINUE these medications which have NOT CHANGED    Details   metFORMIN (GLUCOPHAGE-XR) 500 MG extended release tablet 2 times daily Historical Med      Liraglutide (VICTOZA) 18 MG/3ML SOPN SC injection as directedHistorical Med      SITagliptin (JANUVIA) 100 MG tablet 1 tabletHistorical Med      fluticasone-salmeterol (ADVAIR DISKUS) 100-50 MCG/DOSE diskus inhaler 1 puffHistorical Med      albuterol (PROVENTIL) (2.5 MG/3ML) 0.083% nebulizer solution 3 ml as neededHistorical Med      sodium chloride (AYR SALINE NASAL DROPS) 0.65 % (Soln) SOLN nasal drops 1 sprayHistorical Med      Crisaborole (EUCRISA) 2 % OINT 1 applicationHistorical Med      fluticasone (FLONASE) 50 MCG/ACT nasal spray 1 spray in each nostrilHistorical Med      lisinopril (PRINIVIL;ZESTRIL) 5 MG tablet 1 tabletHistorical Med      Magnesium 300 MG CAPS 1 capsule with a mealHistorical Med      guaiFENesin (MUCINEX) 600 MG extended release